# Patient Record
Sex: FEMALE | Race: WHITE
[De-identification: names, ages, dates, MRNs, and addresses within clinical notes are randomized per-mention and may not be internally consistent; named-entity substitution may affect disease eponyms.]

---

## 2017-05-14 ENCOUNTER — HOSPITAL ENCOUNTER (EMERGENCY)
Dept: HOSPITAL 54 - ER | Age: 72
Discharge: HOME | End: 2017-05-14
Payer: MEDICARE

## 2017-05-14 VITALS — WEIGHT: 124 LBS | BODY MASS INDEX: 21.17 KG/M2 | HEIGHT: 64 IN

## 2017-05-14 VITALS — DIASTOLIC BLOOD PRESSURE: 84 MMHG | SYSTOLIC BLOOD PRESSURE: 206 MMHG

## 2017-05-14 DIAGNOSIS — I10: Primary | ICD-10-CM

## 2017-05-14 PROCEDURE — Z7502: HCPCS

## 2017-05-14 PROCEDURE — Z7610: HCPCS

## 2017-05-14 PROCEDURE — A4606 OXYGEN PROBE USED W OXIMETER: HCPCS

## 2017-05-14 NOTE — NUR
PT BIB FAMILY PT [facility] TO ER BED 1 C/OHIGH BLOOD PRESSURE, TAKES: CRESTOR 
5MG, BYSTOLIC 5MG, BENIKAR 40MG. PT AOX4 RR EVEN AND UNLABORED. NO SOB NOTED. 
NAD NOTED. NO NVD AT THIS TIME. PT NOT DIAPHORETIC. PT GOWNED AND PLACED ON 
MONTIOR. DR. TIM AT BEDSIDE FOR EVAL.

## 2021-03-12 ENCOUNTER — HOSPITAL ENCOUNTER (EMERGENCY)
Dept: HOSPITAL 54 - ER | Age: 76
Discharge: HOME | End: 2021-03-12
Payer: MEDICARE

## 2021-03-12 VITALS — SYSTOLIC BLOOD PRESSURE: 171 MMHG | DIASTOLIC BLOOD PRESSURE: 72 MMHG

## 2021-03-12 VITALS — BODY MASS INDEX: 23.19 KG/M2 | HEIGHT: 62 IN | WEIGHT: 126 LBS

## 2021-03-12 DIAGNOSIS — M25.462: Primary | ICD-10-CM

## 2021-03-12 DIAGNOSIS — I10: ICD-10-CM

## 2021-03-12 PROCEDURE — 99283 EMERGENCY DEPT VISIT LOW MDM: CPT

## 2021-03-12 PROCEDURE — 89051 BODY FLUID CELL COUNT: CPT

## 2021-03-12 PROCEDURE — 87070 CULTURE OTHR SPECIMN AEROBIC: CPT

## 2021-03-12 PROCEDURE — 36415 COLL VENOUS BLD VENIPUNCTURE: CPT

## 2021-03-12 PROCEDURE — A6407 PACKING STRIPS, NON-IMPREG: HCPCS

## 2021-03-12 NOTE — NUR
The patient is resting in bed. Denies pain at this time. In room air and denies 
SOB. Respiration regular and unlabored. Will continue to monitor.

## 2021-03-12 NOTE — NUR
The patient is in ER for c/o left knee pain started 3days ago worse 
now/swollen. Patient denies any trauma or injury. Denies SOB. Will continue to 
monitor.

## 2021-08-10 ENCOUNTER — HOSPITAL ENCOUNTER (INPATIENT)
Dept: HOSPITAL 54 - ER | Age: 76
LOS: 3 days | Discharge: NURSING, REHAB OR LONG-TERM CARE | DRG: 871 | End: 2021-08-13
Attending: NURSE PRACTITIONER | Admitting: NURSE PRACTITIONER
Payer: MEDICARE

## 2021-08-10 VITALS — DIASTOLIC BLOOD PRESSURE: 77 MMHG | SYSTOLIC BLOOD PRESSURE: 155 MMHG

## 2021-08-10 VITALS — WEIGHT: 123 LBS | BODY MASS INDEX: 23.22 KG/M2 | HEIGHT: 61 IN

## 2021-08-10 DIAGNOSIS — E78.5: ICD-10-CM

## 2021-08-10 DIAGNOSIS — I70.0: ICD-10-CM

## 2021-08-10 DIAGNOSIS — U07.1: ICD-10-CM

## 2021-08-10 DIAGNOSIS — R09.02: ICD-10-CM

## 2021-08-10 DIAGNOSIS — Z79.82: ICD-10-CM

## 2021-08-10 DIAGNOSIS — I10: ICD-10-CM

## 2021-08-10 DIAGNOSIS — J06.9: ICD-10-CM

## 2021-08-10 DIAGNOSIS — E87.1: ICD-10-CM

## 2021-08-10 DIAGNOSIS — A41.89: Primary | ICD-10-CM

## 2021-08-10 DIAGNOSIS — Z79.899: ICD-10-CM

## 2021-08-10 DIAGNOSIS — N17.0: ICD-10-CM

## 2021-08-10 DIAGNOSIS — E86.0: ICD-10-CM

## 2021-08-10 DIAGNOSIS — I95.9: ICD-10-CM

## 2021-08-10 DIAGNOSIS — E78.00: ICD-10-CM

## 2021-08-10 LAB
ALBUMIN SERPL BCP-MCNC: 3.4 G/DL (ref 3.4–5)
ALP SERPL-CCNC: 38 U/L (ref 46–116)
ALT SERPL W P-5'-P-CCNC: 17 U/L (ref 12–78)
AST SERPL W P-5'-P-CCNC: 39 U/L (ref 15–37)
BASOPHILS # BLD AUTO: 0 K/UL (ref 0–0.2)
BASOPHILS NFR BLD AUTO: 0.8 % (ref 0–2)
BILIRUB DIRECT SERPL-MCNC: 0.1 MG/DL (ref 0–0.2)
BILIRUB SERPL-MCNC: 0.3 MG/DL (ref 0.2–1)
BILIRUB UR QL STRIP: NEGATIVE
BUN SERPL-MCNC: 24 MG/DL (ref 7–18)
CALCIUM SERPL-MCNC: 8.8 MG/DL (ref 8.5–10.1)
CHLORIDE SERPL-SCNC: 99 MMOL/L (ref 98–107)
CO2 SERPL-SCNC: 23 MMOL/L (ref 21–32)
COLOR UR: YELLOW
CREAT SERPL-MCNC: 1.5 MG/DL (ref 0.6–1.3)
EOSINOPHIL NFR BLD AUTO: 0 % (ref 0–6)
GLUCOSE SERPL-MCNC: 137 MG/DL (ref 74–106)
GLUCOSE UR STRIP-MCNC: NEGATIVE MG/DL
HCT VFR BLD AUTO: 39 % (ref 33–45)
HGB BLD-MCNC: 13.1 G/DL (ref 11.5–14.8)
LEUKOCYTE ESTERASE UR QL STRIP: NEGATIVE
LYMPHOCYTES NFR BLD AUTO: 1 K/UL (ref 0.8–4.8)
LYMPHOCYTES NFR BLD AUTO: 18.1 % (ref 20–44)
MCHC RBC AUTO-ENTMCNC: 34 G/DL (ref 31–36)
MCV RBC AUTO: 91 FL (ref 82–100)
MONOCYTES NFR BLD AUTO: 0.7 K/UL (ref 0.1–1.3)
MONOCYTES NFR BLD AUTO: 12 % (ref 2–12)
NEUTROPHILS # BLD AUTO: 3.8 K/UL (ref 1.8–8.9)
NEUTROPHILS NFR BLD AUTO: 69.1 % (ref 43–81)
NITRITE UR QL STRIP: NEGATIVE
PH UR STRIP: 6 [PH] (ref 5–8)
PLATELET # BLD AUTO: 144 K/UL (ref 150–450)
POTASSIUM SERPL-SCNC: 4.6 MMOL/L (ref 3.5–5.1)
PROT SERPL-MCNC: 7 G/DL (ref 6.4–8.2)
PROT UR QL STRIP: 30 MG/DL
RBC # BLD AUTO: 4.29 MIL/UL (ref 4–5.2)
RBC #/AREA URNS HPF: (no result) /HPF (ref 0–2)
SODIUM SERPL-SCNC: 134 MMOL/L (ref 136–145)
UROBILINOGEN UR STRIP-MCNC: 0.2 EU/DL
WBC #/AREA URNS HPF: (no result) /HPF (ref 0–3)
WBC NRBC COR # BLD AUTO: 5.6 K/UL (ref 4.3–11)

## 2021-08-10 PROCEDURE — G0378 HOSPITAL OBSERVATION PER HR: HCPCS

## 2021-08-10 PROCEDURE — C9803 HOPD COVID-19 SPEC COLLECT: HCPCS

## 2021-08-10 RX ADMIN — SODIUM CHLORIDE PRN MLS/HR: 9 INJECTION, SOLUTION INTRAVENOUS at 20:38

## 2021-08-10 RX ADMIN — ACETAMINOPHEN PRN MG: 325 TABLET ORAL at 21:07

## 2021-08-10 RX ADMIN — ATORVASTATIN CALCIUM SCH MG: 10 TABLET, FILM COATED ORAL at 21:07

## 2021-08-10 RX ADMIN — HEPARIN SODIUM SCH UNITS: 5000 INJECTION INTRAVENOUS; SUBCUTANEOUS at 21:08

## 2021-08-10 RX ADMIN — ATORVASTATIN CALCIUM SCH MG: 10 TABLET, FILM COATED ORAL at 21:24

## 2021-08-10 NOTE — NUR
RN NOTE 



PATIENT REFUSED TO TAKE LIPITOR , STATES SHE ALREADY HAS TAKEN HER CHOLESTEROL MEDICATION 
FOR TODAY. HNO MEDICATIONS AT BEDSIDE AT THIS TIME.

## 2021-08-10 NOTE — NUR
RN ADMISSION NOTE 



RECEIVED PATIENT VIA GURNEY. A/OX3. LANGUAGE BARRIER. ABLE TO MAKE BASIC NEEDS KNOWN. 
TOLERATING ROOM AIR. RESPIRATIONS ARE EVEN AND UNLABORED. NO C/O PAIN AT THIS TIME. IN NO 
APPARENT DISTRESS. IV ACCESS IN RIGHT HAND PATENT AND SALINE LOCKED. WILL BEGIN IVF. 
OBTAINED PATIENTS INFORMATION FROM DAUGHTER RADHA [phone]. CNA OBTAINED VITAL SIGNS 
AND COMPLETED BELONGING LIST. INITIAL PHYSICAL ASSESSMENT COMPETED AT THIS TIME. SKIN 
ASSESSMENT COMPLETE, SKIN INTACT. BED IS LOW AND LOCKED, HOB ELEVTED IN SEMI FOWLERS, SIDE 
RAILS UP X2, CALL LIGHT WITHIN REACH. EXPLAINED USE. WILL CONTINUE TO MONITOR THROUGHOUT 
SHIFT. 

-------------------------------------------------------------------------------

Addendum: 08/10/21 at 9012 by REGINO OZUNA RN

-------------------------------------------------------------------------------

TELE MONITOR READS SINUS RHYTHM WITH PAC'S

## 2021-08-10 NOTE — NUR
TO ER BED 1, C/O DIARRHEA WEAKNESS X 3 DAYS S/P TAKING AMOXICILLIN X 3 DAYS  
FOR COUGH, SALINE LOCK ESTABLISHED, BLOOD DRAWN AND SENT TO LAB

## 2021-08-11 VITALS — DIASTOLIC BLOOD PRESSURE: 63 MMHG | SYSTOLIC BLOOD PRESSURE: 179 MMHG

## 2021-08-11 VITALS — DIASTOLIC BLOOD PRESSURE: 52 MMHG | SYSTOLIC BLOOD PRESSURE: 104 MMHG

## 2021-08-11 VITALS — DIASTOLIC BLOOD PRESSURE: 59 MMHG | SYSTOLIC BLOOD PRESSURE: 122 MMHG

## 2021-08-11 LAB
BASOPHILS # BLD AUTO: 0 K/UL (ref 0–0.2)
BASOPHILS NFR BLD AUTO: 0.5 % (ref 0–2)
BUN SERPL-MCNC: 19 MG/DL (ref 7–18)
CALCIUM SERPL-MCNC: 7.6 MG/DL (ref 8.5–10.1)
CHLORIDE SERPL-SCNC: 105 MMOL/L (ref 98–107)
CHOLEST SERPL-MCNC: 63 MG/DL (ref ?–200)
CO2 SERPL-SCNC: 24 MMOL/L (ref 21–32)
CREAT SERPL-MCNC: 1.1 MG/DL (ref 0.6–1.3)
CRP SERPL-MCNC: 6.7 MG/DL (ref 0–0.9)
EOSINOPHIL NFR BLD AUTO: 0.1 % (ref 0–6)
FERRITIN SERPL-MCNC: 1155 NG/ML (ref 8–388)
GLUCOSE SERPL-MCNC: 92 MG/DL (ref 74–106)
HCT VFR BLD AUTO: 36 % (ref 33–45)
HDLC SERPL-MCNC: 29 MG/DL (ref 40–60)
HGB BLD-MCNC: 12.1 G/DL (ref 11.5–14.8)
LDLC SERPL DIRECT ASSAY-MCNC: 24 MG/DL (ref 0–99)
LYMPHOCYTES NFR BLD AUTO: 1.5 K/UL (ref 0.8–4.8)
LYMPHOCYTES NFR BLD AUTO: 31 % (ref 20–44)
MAGNESIUM SERPL-MCNC: 1.9 MG/DL (ref 1.8–2.4)
MCHC RBC AUTO-ENTMCNC: 34 G/DL (ref 31–36)
MCV RBC AUTO: 91 FL (ref 82–100)
MONOCYTES NFR BLD AUTO: 0.4 K/UL (ref 0.1–1.3)
MONOCYTES NFR BLD AUTO: 7.9 % (ref 2–12)
NEUTROPHILS # BLD AUTO: 3 K/UL (ref 1.8–8.9)
NEUTROPHILS NFR BLD AUTO: 60.5 % (ref 43–81)
PHOSPHATE SERPL-MCNC: 3.6 MG/DL (ref 2.5–4.9)
PLATELET # BLD AUTO: 143 K/UL (ref 150–450)
POTASSIUM SERPL-SCNC: 4 MMOL/L (ref 3.5–5.1)
RBC # BLD AUTO: 3.94 MIL/UL (ref 4–5.2)
SODIUM SERPL-SCNC: 139 MMOL/L (ref 136–145)
TRIGL SERPL-MCNC: 75 MG/DL (ref 30–150)
WBC NRBC COR # BLD AUTO: 5 K/UL (ref 4.3–11)

## 2021-08-11 RX ADMIN — ATORVASTATIN CALCIUM SCH MG: 10 TABLET, FILM COATED ORAL at 21:29

## 2021-08-11 RX ADMIN — DEXTROSE MONOHYDRATE SCH MLS/HR: 50 INJECTION, SOLUTION INTRAVENOUS at 14:48

## 2021-08-11 RX ADMIN — PANTOPRAZOLE SODIUM SCH MG: 40 TABLET, DELAYED RELEASE ORAL at 07:52

## 2021-08-11 RX ADMIN — METOPROLOL TARTRATE SCH MG: 25 TABLET, FILM COATED ORAL at 21:30

## 2021-08-11 RX ADMIN — HEPARIN SODIUM SCH UNITS: 5000 INJECTION INTRAVENOUS; SUBCUTANEOUS at 21:32

## 2021-08-11 RX ADMIN — ASPIRIN 81 MG SCH MG: 81 TABLET ORAL at 09:56

## 2021-08-11 RX ADMIN — SODIUM CHLORIDE PRN MLS/HR: 9 INJECTION, SOLUTION INTRAVENOUS at 21:30

## 2021-08-11 RX ADMIN — HEPARIN SODIUM SCH UNITS: 5000 INJECTION INTRAVENOUS; SUBCUTANEOUS at 08:14

## 2021-08-11 RX ADMIN — ACETAMINOPHEN PRN MG: 325 TABLET ORAL at 05:09

## 2021-08-11 NOTE — NUR
RN NOTE



PATIENT ALERT AND ORIENTED X3. ON O2 2L VIA NASAL CANNULA. RESPIRATIONS EVEN AND UNLABORED. 
DENIES ANY PAIN OR DISCOMFORT. TELE MONITOR ON, SR 70'S. IV ACCESS ON RIGHT HAND # 20 WITH 
NS @ 75ML/HR. BED LOCKED AND IN LOWEST POSITION. CALL LIGHT WITHIN REACH. ALL NEEDS 
ANTICIPATED.

## 2021-08-11 NOTE — NUR
RN NOTE 



COVID ISOLATION. RESTING IN BED. A/OX3. ON 2L NASAL CANNULA. NO C/O PAIN AT THIS TIME. 
TYLENOL GIVEN FOR SLIGHT TEMP 100.8 .NO DISTRESS. IV IN RIGHT HAND . BED IS LOW AND LOCKED, 
HOB ELEVATED IN SEMI FOWLERS, SIDE RAILS UP X2, CALL LIGHT WITHIN REACH. WILL ENDORSE TO 
ONCOMING SHIFT.

## 2021-08-11 NOTE — NUR
RN NOTES

PATIENT IS A&O X3, ON COVID ISOLATION AT THIS TIME , RESTING IN BED WITH ON AND OF SLEEP , 
ON 2L NASAL CANNULA COMPLIANT WITH CARE, NO C/O PAIN DURING AM CARE, NO SOB  AND NO DISTRESS 
NOTED,  IV INTACT ON RIGHT SIDE,  BED IS LOW AND LOCKED, HOB ELEVATED IN SEMI FOWLERS, SIDE 
RAILS UP X2, CALL LIGHT WITHIN REACH, DAUGHTER CALLS OFTEN -ABLE TO EXPLAIN TO HER ABOUT 
MOTHERS SITUATION -EXPRESSED UNDERSTANDING, CALL LIGHT IN PT REACH, SAFETY MEASURES IN 
PLACE, GIVEN ALL MD ORDERS NO ASPIRATION ON WHOLE PO PILLS, WILL CONTINUE TO MONITOR.

## 2021-08-11 NOTE — NUR
PATIENT NOTED WITH BLOOD PRESSURE 179/63, HR 78. INFORMED DR. GARCIA WITH NEW ORDERS NOTED AND 
CARRIED OUT.

## 2021-08-12 VITALS — DIASTOLIC BLOOD PRESSURE: 55 MMHG | SYSTOLIC BLOOD PRESSURE: 133 MMHG

## 2021-08-12 VITALS — DIASTOLIC BLOOD PRESSURE: 58 MMHG | SYSTOLIC BLOOD PRESSURE: 137 MMHG

## 2021-08-12 VITALS — DIASTOLIC BLOOD PRESSURE: 82 MMHG | SYSTOLIC BLOOD PRESSURE: 144 MMHG

## 2021-08-12 VITALS — SYSTOLIC BLOOD PRESSURE: 118 MMHG | DIASTOLIC BLOOD PRESSURE: 51 MMHG

## 2021-08-12 VITALS — SYSTOLIC BLOOD PRESSURE: 130 MMHG | DIASTOLIC BLOOD PRESSURE: 60 MMHG

## 2021-08-12 VITALS — DIASTOLIC BLOOD PRESSURE: 72 MMHG | SYSTOLIC BLOOD PRESSURE: 156 MMHG

## 2021-08-12 LAB
BASE EXCESS BLDA CALC-SCNC: -0.9 MMOL/L
BASOPHILS # BLD AUTO: 0 K/UL (ref 0–0.2)
BASOPHILS NFR BLD AUTO: 0.3 % (ref 0–2)
BUN SERPL-MCNC: 15 MG/DL (ref 7–18)
CALCIUM SERPL-MCNC: 7.4 MG/DL (ref 8.5–10.1)
CHLORIDE SERPL-SCNC: 104 MMOL/L (ref 98–107)
CO2 SERPL-SCNC: 21 MMOL/L (ref 21–32)
CREAT SERPL-MCNC: 1.1 MG/DL (ref 0.6–1.3)
DO-HGB MFR BLDA: 70.8 MMHG
EOSINOPHIL NFR BLD AUTO: 0 % (ref 0–6)
GLUCOSE SERPL-MCNC: 82 MG/DL (ref 74–106)
HCT VFR BLD AUTO: 36 % (ref 33–45)
HGB BLD-MCNC: 12.2 G/DL (ref 11.5–14.8)
INHALED O2 CONCENTRATION: 24 %
LYMPHOCYTES NFR BLD AUTO: 1.5 K/UL (ref 0.8–4.8)
LYMPHOCYTES NFR BLD AUTO: 24.5 % (ref 20–44)
MAGNESIUM SERPL-MCNC: 1.9 MG/DL (ref 1.8–2.4)
MCHC RBC AUTO-ENTMCNC: 34 G/DL (ref 31–36)
MCV RBC AUTO: 90 FL (ref 82–100)
MONOCYTES NFR BLD AUTO: 0.6 K/UL (ref 0.1–1.3)
MONOCYTES NFR BLD AUTO: 9.6 % (ref 2–12)
NEUTROPHILS # BLD AUTO: 3.9 K/UL (ref 1.8–8.9)
NEUTROPHILS NFR BLD AUTO: 65.6 % (ref 43–81)
PCO2 TEMP ADJ BLDA: 29.6 MMHG (ref 35–45)
PH TEMP ADJ BLDA: 7.48 [PH] (ref 7.35–7.45)
PHOSPHATE SERPL-MCNC: 2.9 MG/DL (ref 2.5–4.9)
PLATELET # BLD AUTO: 165 K/UL (ref 150–450)
PO2 TEMP ADJ BLDA: 65.1 MMHG (ref 75–100)
POTASSIUM SERPL-SCNC: 3.8 MMOL/L (ref 3.5–5.1)
RBC # BLD AUTO: 3.96 MIL/UL (ref 4–5.2)
SAO2 % BLDA: 94.2 % (ref 92–98.5)
SODIUM SERPL-SCNC: 138 MMOL/L (ref 136–145)
VENTILATION MODE VENT: (no result)
WBC NRBC COR # BLD AUTO: 6 K/UL (ref 4.3–11)

## 2021-08-12 RX ADMIN — PANTOPRAZOLE SODIUM SCH MG: 40 TABLET, DELAYED RELEASE ORAL at 08:23

## 2021-08-12 RX ADMIN — ATORVASTATIN CALCIUM SCH MG: 10 TABLET, FILM COATED ORAL at 21:18

## 2021-08-12 RX ADMIN — ASPIRIN 81 MG SCH MG: 81 TABLET ORAL at 08:26

## 2021-08-12 RX ADMIN — ENOXAPARIN SODIUM SCH MG: 40 INJECTION SUBCUTANEOUS at 12:00

## 2021-08-12 RX ADMIN — DEXAMETHASONE SCH MG: 4 TABLET ORAL at 10:13

## 2021-08-12 RX ADMIN — METOPROLOL TARTRATE SCH MG: 25 TABLET, FILM COATED ORAL at 21:18

## 2021-08-12 RX ADMIN — SODIUM CHLORIDE PRN MLS/HR: 9 INJECTION, SOLUTION INTRAVENOUS at 22:46

## 2021-08-12 RX ADMIN — HEPARIN SODIUM SCH UNITS: 5000 INJECTION INTRAVENOUS; SUBCUTANEOUS at 08:27

## 2021-08-12 RX ADMIN — METOPROLOL TARTRATE SCH MG: 25 TABLET, FILM COATED ORAL at 08:26

## 2021-08-12 RX ADMIN — DEXTROSE MONOHYDRATE SCH MLS/HR: 50 INJECTION, SOLUTION INTRAVENOUS at 14:12

## 2021-08-12 NOTE — NUR
RN NOTE



PATIENT RESTING IN BED COMFORTABLY. ALERT AND ORIENTED X3. NO SIGNS OF RESPIRATORY DISTRESS. 
ALL NEEDS ATTENDED PROMPTLY. BED LOCKED AND IN LOWEST POSITION. CALL LIGHT WITHIN REACH. 
WILL ENDORSE TO AM SHIFT.

## 2021-08-12 NOTE — NUR
RN OPENING NOTE



RECEIVED PATIENT IN BED, ASLEEP,  EASILY AWAKEN BY VERBAL AND TACTILE STIMULI. ON O2 2L VIA 
NASAL CANNULA AT 2LPM, RESPIRATIONS EVEN AND UNLABORED. DENIES ANY PAIN OR DISCOMFORT. TELE 
MONITOR ON, SR 70'S. IV ACCESS ON RIGHT HAND # 20 WITH NS @ 75ML/HR. SAFETY PRECAUTIONS IN 
PLACE: BED LOCKED AND IN LOWEST POSITION. CALL LIGHT WITHIN REACH. SIDE RAILS UP X 2. WILL 
MONITOR PATIENT ACCORDINGLY.

## 2021-08-12 NOTE — NUR
TELE RN CLOSING NOTES



PATIENT IN BED, AWAKE, A&O X 4. ON O2 2L VIA NASAL CANNULA AT 2LPM, RESPIRATIONS EVEN AND 
UNLABORED. DENIES ANY PAIN OR DISCOMFORT. TELE MONITOR ON, SR 60'S. IV ACCESS ON RIGHT HAND 
# 20 WITH NS @ 75ML/HR. SAFETY PRECAUTIONS IN PLACE: BED LOCKED AND IN LOWEST POSITION. CALL 
LIGHT WITHIN REACH. SIDE RAILS UP X 2. ALL NEEDS ATTENDED, WILL ENDORSE TO ONCOMING SHIFT 
FOR VALARIE.

## 2021-08-13 VITALS — SYSTOLIC BLOOD PRESSURE: 153 MMHG | DIASTOLIC BLOOD PRESSURE: 65 MMHG

## 2021-08-13 VITALS — DIASTOLIC BLOOD PRESSURE: 80 MMHG | SYSTOLIC BLOOD PRESSURE: 131 MMHG

## 2021-08-13 VITALS — SYSTOLIC BLOOD PRESSURE: 160 MMHG | DIASTOLIC BLOOD PRESSURE: 66 MMHG

## 2021-08-13 VITALS — DIASTOLIC BLOOD PRESSURE: 71 MMHG | SYSTOLIC BLOOD PRESSURE: 155 MMHG

## 2021-08-13 VITALS — SYSTOLIC BLOOD PRESSURE: 155 MMHG | DIASTOLIC BLOOD PRESSURE: 70 MMHG

## 2021-08-13 LAB
BASOPHILS # BLD AUTO: 0 K/UL (ref 0–0.2)
BASOPHILS NFR BLD AUTO: 0.3 % (ref 0–2)
BUN SERPL-MCNC: 17 MG/DL (ref 7–18)
CALCIUM SERPL-MCNC: 7.5 MG/DL (ref 8.5–10.1)
CHLORIDE SERPL-SCNC: 105 MMOL/L (ref 98–107)
CO2 SERPL-SCNC: 23 MMOL/L (ref 21–32)
CREAT SERPL-MCNC: 0.8 MG/DL (ref 0.6–1.3)
EOSINOPHIL NFR BLD AUTO: 0 % (ref 0–6)
GLUCOSE SERPL-MCNC: 152 MG/DL (ref 74–106)
HCT VFR BLD AUTO: 37 % (ref 33–45)
HGB BLD-MCNC: 12.7 G/DL (ref 11.5–14.8)
LYMPHOCYTES NFR BLD AUTO: 1.2 K/UL (ref 0.8–4.8)
LYMPHOCYTES NFR BLD AUTO: 14 % (ref 20–44)
MAGNESIUM SERPL-MCNC: 2.1 MG/DL (ref 1.8–2.4)
MCHC RBC AUTO-ENTMCNC: 34 G/DL (ref 31–36)
MCV RBC AUTO: 90 FL (ref 82–100)
MONOCYTES NFR BLD AUTO: 0.6 K/UL (ref 0.1–1.3)
MONOCYTES NFR BLD AUTO: 7 % (ref 2–12)
NEUTROPHILS # BLD AUTO: 6.8 K/UL (ref 1.8–8.9)
NEUTROPHILS NFR BLD AUTO: 78.7 % (ref 43–81)
PHOSPHATE SERPL-MCNC: 2.2 MG/DL (ref 2.5–4.9)
PLATELET # BLD AUTO: 184 K/UL (ref 150–450)
POTASSIUM SERPL-SCNC: 3.7 MMOL/L (ref 3.5–5.1)
RBC # BLD AUTO: 4.17 MIL/UL (ref 4–5.2)
SODIUM SERPL-SCNC: 138 MMOL/L (ref 136–145)
WBC NRBC COR # BLD AUTO: 8.7 K/UL (ref 4.3–11)

## 2021-08-13 RX ADMIN — PANTOPRAZOLE SODIUM SCH MG: 40 TABLET, DELAYED RELEASE ORAL at 08:04

## 2021-08-13 RX ADMIN — ENOXAPARIN SODIUM SCH MG: 40 INJECTION SUBCUTANEOUS at 11:03

## 2021-08-13 RX ADMIN — DEXAMETHASONE SCH MG: 4 TABLET ORAL at 08:04

## 2021-08-13 RX ADMIN — DEXTROSE MONOHYDRATE SCH MLS/HR: 50 INJECTION, SOLUTION INTRAVENOUS at 14:23

## 2021-08-13 RX ADMIN — METOPROLOL TARTRATE SCH MG: 25 TABLET, FILM COATED ORAL at 08:04

## 2021-08-13 RX ADMIN — ASPIRIN 81 MG SCH MG: 81 TABLET ORAL at 08:03

## 2021-08-13 NOTE — NUR
RN NOTES



SPOKE W/ PATIENT AND PATIENT'S DTR ABOUT POSSIBLE DISCHARGE TODAY TO HOME. PER PATIENT AND 
DTR, PATIENT STILL FEELS WEAK AND NO ONE CAN TAKE CARE OF HER AT HOME. INFORMED  
SHERYL ABOUT PATIENT'S CASE AND WILL CHECK INSURANCE FOR PLACEMENT.

## 2021-08-13 NOTE — NUR
RN NOTES



SPOKE W/ PATIENT'S DTR RADHA ([phone]) AND INFORMED ABOUT PATIENT'S CURRENT 
CONDITION/PROGRESS AND PLAN OF CARE.

## 2021-08-13 NOTE — NUR
RN NOTES



PATIENT REPORT AND DISCHARGE INSTRUCTIONS GIVEN TO TIMOTHY GUIDO SUPERVISOR AT [facility].

## 2021-08-13 NOTE — NUR
TELE RN NOTES



PATIENT IN BED RESTING, AWAKE AND VERBALLY RESPONSIVE, A&O X 4. Nicaraguan-SPEAKING, UNDERSTANDS 
LITTLE ENGLISH. ON O2 AT 2L VIA NASAL CANNULA, RESPIRATIONS EVEN AND UNLABORED. ON TELE 
MONITORING, READING OF SR W/ HEART RATE IN THE 60'S. IV ACCESS ON RIGHT HAND # 20 INTACT AND 
PATENT, IVF OF NS @ 75ML/HR, INFUSING WELL. SAFETY PRECAUTIONS IN PLACE: BED LOCKED AND IN 
LOWEST POSITION. CALL LIGHT WITHIN REACH. SIDE RAILS UP X 2. WILL CONTINUE TO MONITOR.

## 2021-08-13 NOTE — NUR
RN notes



Alert and oriented, able to communicate needs verbally in Wallisian language but understand a 
little English. Ambulatory with assist. On 2 l/m O2 via nasal cannula, tolerating well. No 
complaint of pain or discomfort. No significant change of condition. Vital signs within 
normal limits. Kept clean and dry. Will endorse to next shift for continuity of care.

## 2021-08-13 NOTE — NUR
RN NOTES



SPOKE JOSELITO JOHN, , AND WAS INFORMED THAT PATIENT IS ACCEPTED FOR PLACEMENT AT 
[facility]; CM TO INFORM DTR.

## 2021-08-15 ENCOUNTER — HOSPITAL ENCOUNTER (INPATIENT)
Dept: HOSPITAL 54 - ER | Age: 76
LOS: 7 days | Discharge: HOME HEALTH | DRG: 177 | End: 2021-08-22
Attending: NURSE PRACTITIONER
Payer: MEDICARE

## 2021-08-15 VITALS — HEIGHT: 64 IN | BODY MASS INDEX: 20.04 KG/M2 | WEIGHT: 117.38 LBS

## 2021-08-15 VITALS — SYSTOLIC BLOOD PRESSURE: 165 MMHG | DIASTOLIC BLOOD PRESSURE: 73 MMHG

## 2021-08-15 DIAGNOSIS — T38.0X5A: ICD-10-CM

## 2021-08-15 DIAGNOSIS — Z79.82: ICD-10-CM

## 2021-08-15 DIAGNOSIS — I11.0: ICD-10-CM

## 2021-08-15 DIAGNOSIS — J12.82: ICD-10-CM
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
## 2021-08-15 DIAGNOSIS — J90: ICD-10-CM

## 2021-08-15 DIAGNOSIS — J96.01: ICD-10-CM

## 2021-08-15 DIAGNOSIS — N39.0: ICD-10-CM

## 2021-08-15 DIAGNOSIS — E78.5: ICD-10-CM

## 2021-08-15 DIAGNOSIS — D72.829: ICD-10-CM

## 2021-08-15 DIAGNOSIS — D68.59: ICD-10-CM

## 2021-08-15 DIAGNOSIS — I50.9: ICD-10-CM

## 2021-08-15 DIAGNOSIS — J98.11: ICD-10-CM

## 2021-08-15 DIAGNOSIS — R73.9: ICD-10-CM

## 2021-08-15 DIAGNOSIS — Z79.899: ICD-10-CM

## 2021-08-15 DIAGNOSIS — U07.1: Primary | ICD-10-CM

## 2021-08-15 DIAGNOSIS — J12.89: ICD-10-CM

## 2021-08-15 DIAGNOSIS — I70.0: ICD-10-CM

## 2021-08-15 DIAGNOSIS — E44.0: ICD-10-CM

## 2021-08-15 DIAGNOSIS — Z74.09: ICD-10-CM

## 2021-08-15 LAB
ALBUMIN SERPL BCP-MCNC: 2.6 G/DL (ref 3.4–5)
ALP SERPL-CCNC: 53 U/L (ref 46–116)
ALT SERPL W P-5'-P-CCNC: 32 U/L (ref 12–78)
AST SERPL W P-5'-P-CCNC: 60 U/L (ref 15–37)
BASE EXCESS BLDA CALC-SCNC: 0.3 MMOL/L
BASOPHILS # BLD AUTO: 0 K/UL (ref 0–0.2)
BASOPHILS NFR BLD AUTO: 0.1 % (ref 0–2)
BILIRUB DIRECT SERPL-MCNC: 0.1 MG/DL (ref 0–0.2)
BILIRUB SERPL-MCNC: 0.3 MG/DL (ref 0.2–1)
BILIRUB UR QL STRIP: NEGATIVE
BUN SERPL-MCNC: 25 MG/DL (ref 7–18)
CALCIUM SERPL-MCNC: 8.2 MG/DL (ref 8.5–10.1)
CHLORIDE SERPL-SCNC: 104 MMOL/L (ref 98–107)
CK SERPL-CCNC: 73 U/L (ref 26–192)
CO2 SERPL-SCNC: 26 MMOL/L (ref 21–32)
COLOR UR: YELLOW
CREAT SERPL-MCNC: 1 MG/DL (ref 0.6–1.3)
CRP SERPL-MCNC: 8.2 MG/DL (ref 0–0.9)
D DIMER PPP FEU-MCNC: 3.47 MG/L(FEU (ref 0.17–0.5)
DEPRECATED SQUAMOUS URNS QL MICRO: (no result) /HPF
DO-HGB MFR BLDA: 180.2 MMHG
EOSINOPHIL NFR BLD AUTO: 0 % (ref 0–6)
FERRITIN SERPL-MCNC: 3187 NG/ML (ref 8–388)
GLUCOSE SERPL-MCNC: 207 MG/DL (ref 74–106)
GLUCOSE UR STRIP-MCNC: NEGATIVE MG/DL
HCT VFR BLD AUTO: 37 % (ref 33–45)
HGB BLD-MCNC: 12.4 G/DL (ref 11.5–14.8)
INHALED O2 CONCENTRATION: 40 %
LEUKOCYTE ESTERASE UR QL STRIP: NEGATIVE
LYMPHOCYTES NFR BLD AUTO: 0.7 K/UL (ref 0.8–4.8)
LYMPHOCYTES NFR BLD AUTO: 6.5 % (ref 20–44)
MCHC RBC AUTO-ENTMCNC: 34 G/DL (ref 31–36)
MCV RBC AUTO: 90 FL (ref 82–100)
MONOCYTES NFR BLD AUTO: 0.9 K/UL (ref 0.1–1.3)
MONOCYTES NFR BLD AUTO: 7.9 % (ref 2–12)
NEUTROPHILS # BLD AUTO: 9.4 K/UL (ref 1.8–8.9)
NEUTROPHILS NFR BLD AUTO: 85.5 % (ref 43–81)
NITRITE UR QL STRIP: NEGATIVE
PCO2 TEMP ADJ BLDA: 32.2 MMHG (ref 35–45)
PH TEMP ADJ BLDA: 7.47 [PH] (ref 7.35–7.45)
PH UR STRIP: 6 [PH] (ref 5–8)
PLATELET # BLD AUTO: 287 K/UL (ref 150–450)
PO2 TEMP ADJ BLDA: 68 MMHG (ref 75–100)
POTASSIUM SERPL-SCNC: 4.1 MMOL/L (ref 3.5–5.1)
PROT SERPL-MCNC: 6.3 G/DL (ref 6.4–8.2)
PROT UR QL STRIP: 30 MG/DL
RBC # BLD AUTO: 4.06 MIL/UL (ref 4–5.2)
RBC #/AREA URNS HPF: (no result) /HPF (ref 0–2)
SAO2 % BLDA: 93.8 % (ref 92–98.5)
SODIUM SERPL-SCNC: 139 MMOL/L (ref 136–145)
UROBILINOGEN UR STRIP-MCNC: 1 EU/DL
VENTILATION MODE VENT: (no result)
WBC #/AREA URNS HPF: (no result) /HPF
WBC #/AREA URNS HPF: (no result) /HPF (ref 0–3)
WBC NRBC COR # BLD AUTO: 10.9 K/UL (ref 4.3–11)

## 2021-08-15 PROCEDURE — G0378 HOSPITAL OBSERVATION PER HR: HCPCS

## 2021-08-15 PROCEDURE — A4216 STERILE WATER/SALINE, 10 ML: HCPCS

## 2021-08-15 RX ADMIN — ENOXAPARIN SODIUM SCH MG: 40 INJECTION SUBCUTANEOUS at 23:00

## 2021-08-15 NOTE — NUR
PATIENT ANNELIESE BENITES FROM [facility] WITH C/O LOW O2 SAT. DIAGNOSED W/ COVID 19 6 
DAYS AGO. PATIENT IS A/O, ANSWERING STAFF. CONNECTED TO OXYGEN. PATIENT 
CONNECTED TO CARDIAC MONITOR AND POX.

## 2021-08-15 NOTE — NUR
RN ADMISSION NOTE 



RECEIVED PATIENT VIA RDORINA. A/OX3. Liberian SPEAKING, ABLE TO MAKE BASIC NEEDS KNOWN. ON 
OXYGEN 5L/MIN. RESPIRATIONS ARE EVEN AND UNLABORED. NO S/S SOB AT THIS TIME. PATIENT HAD 
NONPRODUCTIVE COUGH. NO C/O PAIN AT THIS TIME. IN NO APPARENT DISTRESS. EXTERNAL TELE 
MONITOR REDS SINUS RHYTHM.. IV ACCESS IN LEFT WRIST #18 CURRENTLY RUNNING AZITHROMAX. CNA 
OBTAINED VITALS AND COMPLETED BELONGING LIST.. INATAL PHYSICAL ASSESSMENT COMPLETED. SKIN 
ASSESSMENT COMPETE, SKIN INTACT. BED IS LOW AND COKED, HOB ELEVATED IN SEMI FOWLERS, MATTHEW 
RAILS UP X2, CALL LIGHT WITHIN REACH, INFORMED USE. WILL CONTINUE TO MONITOR THROUGHOUT 
SHIFT.

## 2021-08-16 VITALS — DIASTOLIC BLOOD PRESSURE: 75 MMHG | SYSTOLIC BLOOD PRESSURE: 135 MMHG

## 2021-08-16 VITALS — DIASTOLIC BLOOD PRESSURE: 77 MMHG | SYSTOLIC BLOOD PRESSURE: 177 MMHG

## 2021-08-16 LAB
ALBUMIN SERPL BCP-MCNC: 2.4 G/DL (ref 3.4–5)
ALP SERPL-CCNC: 49 U/L (ref 46–116)
ALT SERPL W P-5'-P-CCNC: 27 U/L (ref 12–78)
AST SERPL W P-5'-P-CCNC: 44 U/L (ref 15–37)
BASOPHILS # BLD AUTO: 0 K/UL (ref 0–0.2)
BASOPHILS NFR BLD AUTO: 0.1 % (ref 0–2)
BILIRUB SERPL-MCNC: 0.3 MG/DL (ref 0.2–1)
BUN SERPL-MCNC: 21 MG/DL (ref 7–18)
CALCIUM SERPL-MCNC: 7.8 MG/DL (ref 8.5–10.1)
CHLORIDE SERPL-SCNC: 106 MMOL/L (ref 98–107)
CHOLEST SERPL-MCNC: 99 MG/DL (ref ?–200)
CO2 SERPL-SCNC: 26 MMOL/L (ref 21–32)
CREAT SERPL-MCNC: 0.9 MG/DL (ref 0.6–1.3)
EOSINOPHIL NFR BLD AUTO: 0 % (ref 0–6)
FERRITIN SERPL-MCNC: 2927 NG/ML (ref 8–388)
GLUCOSE SERPL-MCNC: 179 MG/DL (ref 74–106)
HCT VFR BLD AUTO: 37 % (ref 33–45)
HDLC SERPL-MCNC: 10 MG/DL (ref 40–60)
HGB BLD-MCNC: 12.4 G/DL (ref 11.5–14.8)
LDLC SERPL DIRECT ASSAY-MCNC: 53 MG/DL (ref 0–99)
LYMPHOCYTES NFR BLD AUTO: 0.9 K/UL (ref 0.8–4.8)
LYMPHOCYTES NFR BLD AUTO: 10.1 % (ref 20–44)
MAGNESIUM SERPL-MCNC: 2.5 MG/DL (ref 1.8–2.4)
MCHC RBC AUTO-ENTMCNC: 34 G/DL (ref 31–36)
MCV RBC AUTO: 90 FL (ref 82–100)
MONOCYTES NFR BLD AUTO: 0.6 K/UL (ref 0.1–1.3)
MONOCYTES NFR BLD AUTO: 6.6 % (ref 2–12)
NEUTROPHILS # BLD AUTO: 7.6 K/UL (ref 1.8–8.9)
NEUTROPHILS NFR BLD AUTO: 83.2 % (ref 43–81)
PHOSPHATE SERPL-MCNC: 3.4 MG/DL (ref 2.5–4.9)
PLATELET # BLD AUTO: 284 K/UL (ref 150–450)
POTASSIUM SERPL-SCNC: 4.5 MMOL/L (ref 3.5–5.1)
PROT SERPL-MCNC: 5.8 G/DL (ref 6.4–8.2)
RBC # BLD AUTO: 4.05 MIL/UL (ref 4–5.2)
SODIUM SERPL-SCNC: 141 MMOL/L (ref 136–145)
TRIGL SERPL-MCNC: 200 MG/DL (ref 30–150)
TSH SERPL DL<=0.005 MIU/L-ACNC: 0.16 UIU/ML (ref 0.36–3.74)
WBC NRBC COR # BLD AUTO: 9.1 K/UL (ref 4.3–11)

## 2021-08-16 PROCEDURE — XW033H5 INTRODUCTION OF TOCILIZUMAB INTO PERIPHERAL VEIN, PERCUTANEOUS APPROACH, NEW TECHNOLOGY GROUP 5: ICD-10-PCS | Performed by: INTERNAL MEDICINE

## 2021-08-16 PROCEDURE — XW033E5 INTRODUCTION OF REMDESIVIR ANTI-INFECTIVE INTO PERIPHERAL VEIN, PERCUTANEOUS APPROACH, NEW TECHNOLOGY GROUP 5: ICD-10-PCS | Performed by: INTERNAL MEDICINE

## 2021-08-16 RX ADMIN — Medication SCH ML: at 08:56

## 2021-08-16 RX ADMIN — METOPROLOL TARTRATE SCH MG: 25 TABLET, FILM COATED ORAL at 08:09

## 2021-08-16 RX ADMIN — DEXAMETHASONE SODIUM PHOSPHATE SCH MG: 10 INJECTION INTRAMUSCULAR; INTRAVENOUS at 08:11

## 2021-08-16 RX ADMIN — LOSARTAN POTASSIUM SCH MG: 50 TABLET, FILM COATED ORAL at 08:10

## 2021-08-16 RX ADMIN — ASPIRIN 81 MG SCH MG: 81 TABLET ORAL at 08:10

## 2021-08-16 RX ADMIN — ENOXAPARIN SODIUM SCH MG: 40 INJECTION SUBCUTANEOUS at 21:00

## 2021-08-16 RX ADMIN — DEXTROSE MONOHYDRATE SCH MLS/HR: 50 INJECTION, SOLUTION INTRAVENOUS at 22:20

## 2021-08-16 RX ADMIN — PANTOPRAZOLE SODIUM SCH MG: 40 TABLET, DELAYED RELEASE ORAL at 08:11

## 2021-08-16 RX ADMIN — Medication SCH ML: at 16:12

## 2021-08-16 RX ADMIN — METOPROLOL TARTRATE SCH MG: 25 TABLET, FILM COATED ORAL at 22:15

## 2021-08-16 RX ADMIN — DEXTROSE MONOHYDRATE SCH MLS/HR: 50 INJECTION, SOLUTION INTRAVENOUS at 23:00

## 2021-08-16 NOTE — NUR
TELE RN NOTE

PT IN BED AWAKE. A/O X 3, NO SOB NOTED. NO DISTRESS OR DISCOMFORT NOTED. PT HAS PRODUCTIVE 
COUGH. ON 5L N/C O2 SAT 93%. SL IN LT WRIST INTACT AND PATENT. ON TELE MONITOR SR 79. SIDE 
RAILS UP X 3 AND CALL LIGHT WITHIN REACH. CONTINUE TO MONITOR HER.

## 2021-08-16 NOTE — NUR
RN NOTE 



ON COVID ISOLATION. A/OX3. REMAINS ON OXYGEN 5L/MIN. NO RESP DISTRESS. NO DISTRESS. TELE 
MONITOR SINUS RHYTHM. IV  LEFT WRIST #18. BED REMAINS LOW AND LOCKED, HOB ELEVATED IN SEMI 
FOWLERS, SIDE RAILS UP X2, CALL LIGHT WITHIN REACH. WILL ENDORSE TO INCOMING SHIFT.

## 2021-08-16 NOTE — NUR
RN OPENING NOTE



RECEIVED PATIENT IN BED. A/O X 3. ON 02 5 LPM VIA NC. NO S/S OF RESPIRATORY DISTRESS. TELE 
READING SHOWS SR 70'S. DENIES ANY PAIN OR DISCOMFORT AT THIS TIME. IV ACCESS ON L WRIST #18, 
INTACT AND PATENT. SAFETY MEASURES MAINTAINED AND FALL RISK OBSERVED. BED IN LOWEST 
POSITION, BRAKES LOCKED. SIDE RAILS UP X2. CALL LIGHT WITHIN REACH. WILL CONTINUE PLAN OF 
CARE.

## 2021-08-16 NOTE — NUR
RN CLOSING NOTE



PATIENT RESTING IN BED. A/O X 3. AMBULATORY WITH ASSIST. ON 02 5 LPM VIA NC. NO SOB NOTED. 
NO S/S OF RESPIRATORY DISTRESS. TELE READING SHOWS SR 70'S. NO REPORTS OF ANY PAIN OR 
DISCOMFORT AT THIS TIME. IV ACCESS ON L WRIST #18, INTACT AND PATENT. NO SIGNS OF 
INFILTRATION. DUE MEDS GIVEN AS ORDERED. ALL NEEDS HAVE BEEN MET AND ATTENDED. SAFETY 
MEASURES MAINTAINED AND FALL RISK OBSERVED. BED IN LOWEST POSITION, BRAKES LOCKED. SIDE 
RAILS UP X2. KEPT CALL LIGHT WITHIN REACH. WILL ENDORSE CONTINUITY OF CARE TO ONCOMING 
SHIFT.

## 2021-08-17 VITALS — SYSTOLIC BLOOD PRESSURE: 180 MMHG | DIASTOLIC BLOOD PRESSURE: 56 MMHG

## 2021-08-17 VITALS — DIASTOLIC BLOOD PRESSURE: 70 MMHG | SYSTOLIC BLOOD PRESSURE: 109 MMHG

## 2021-08-17 VITALS — SYSTOLIC BLOOD PRESSURE: 137 MMHG | DIASTOLIC BLOOD PRESSURE: 64 MMHG

## 2021-08-17 VITALS — DIASTOLIC BLOOD PRESSURE: 60 MMHG | SYSTOLIC BLOOD PRESSURE: 147 MMHG

## 2021-08-17 VITALS — SYSTOLIC BLOOD PRESSURE: 139 MMHG | DIASTOLIC BLOOD PRESSURE: 64 MMHG

## 2021-08-17 LAB
ALBUMIN SERPL BCP-MCNC: 2.3 G/DL (ref 3.4–5)
ALP SERPL-CCNC: 42 U/L (ref 46–116)
ALT SERPL W P-5'-P-CCNC: 22 U/L (ref 12–78)
AST SERPL W P-5'-P-CCNC: 26 U/L (ref 15–37)
BASOPHILS # BLD AUTO: 0 K/UL (ref 0–0.2)
BASOPHILS NFR BLD AUTO: 0.1 % (ref 0–2)
BILIRUB DIRECT SERPL-MCNC: 0.1 MG/DL (ref 0–0.2)
BILIRUB SERPL-MCNC: 0.2 MG/DL (ref 0.2–1)
BUN SERPL-MCNC: 27 MG/DL (ref 7–18)
CALCIUM SERPL-MCNC: 7.9 MG/DL (ref 8.5–10.1)
CHLORIDE SERPL-SCNC: 108 MMOL/L (ref 98–107)
CO2 SERPL-SCNC: 27 MMOL/L (ref 21–32)
CREAT SERPL-MCNC: 1 MG/DL (ref 0.6–1.3)
CRP SERPL-MCNC: 3.6 MG/DL (ref 0–0.9)
EOSINOPHIL NFR BLD AUTO: 0 % (ref 0–6)
GLUCOSE SERPL-MCNC: 176 MG/DL (ref 74–106)
HCT VFR BLD AUTO: 35 % (ref 33–45)
HGB BLD-MCNC: 11.5 G/DL (ref 11.5–14.8)
LYMPHOCYTES NFR BLD AUTO: 1.1 K/UL (ref 0.8–4.8)
LYMPHOCYTES NFR BLD AUTO: 9.6 % (ref 20–44)
MAGNESIUM SERPL-MCNC: 2.4 MG/DL (ref 1.8–2.4)
MCHC RBC AUTO-ENTMCNC: 33 G/DL (ref 31–36)
MCV RBC AUTO: 91 FL (ref 82–100)
MONOCYTES NFR BLD AUTO: 0.8 K/UL (ref 0.1–1.3)
MONOCYTES NFR BLD AUTO: 6.6 % (ref 2–12)
NEUTROPHILS # BLD AUTO: 9.5 K/UL (ref 1.8–8.9)
NEUTROPHILS NFR BLD AUTO: 83.7 % (ref 43–81)
PHOSPHATE SERPL-MCNC: 3.1 MG/DL (ref 2.5–4.9)
PLATELET # BLD AUTO: 323 K/UL (ref 150–450)
POTASSIUM SERPL-SCNC: 4.2 MMOL/L (ref 3.5–5.1)
PROT SERPL-MCNC: 5.5 G/DL (ref 6.4–8.2)
RBC # BLD AUTO: 3.86 MIL/UL (ref 4–5.2)
SODIUM SERPL-SCNC: 143 MMOL/L (ref 136–145)
WBC NRBC COR # BLD AUTO: 11.4 K/UL (ref 4.3–11)

## 2021-08-17 RX ADMIN — Medication SCH ML: at 08:35

## 2021-08-17 RX ADMIN — DEXTROSE MONOHYDRATE SCH MLS/HR: 50 INJECTION, SOLUTION INTRAVENOUS at 21:40

## 2021-08-17 RX ADMIN — METOPROLOL TARTRATE SCH MG: 25 TABLET, FILM COATED ORAL at 21:37

## 2021-08-17 RX ADMIN — SODIUM CHLORIDE SCH MLS/HR: 9 INJECTION, SOLUTION INTRAVENOUS at 12:24

## 2021-08-17 RX ADMIN — ASPIRIN 81 MG SCH MG: 81 TABLET ORAL at 08:34

## 2021-08-17 RX ADMIN — ENOXAPARIN SODIUM SCH MG: 40 INJECTION SUBCUTANEOUS at 21:38

## 2021-08-17 RX ADMIN — DEXTROSE MONOHYDRATE SCH MLS/HR: 50 INJECTION, SOLUTION INTRAVENOUS at 22:25

## 2021-08-17 RX ADMIN — Medication SCH ML: at 17:03

## 2021-08-17 RX ADMIN — DEXAMETHASONE SODIUM PHOSPHATE SCH MG: 10 INJECTION INTRAMUSCULAR; INTRAVENOUS at 08:34

## 2021-08-17 RX ADMIN — ACETAMINOPHEN PRN MG: 325 TABLET ORAL at 02:22

## 2021-08-17 RX ADMIN — PANTOPRAZOLE SODIUM SCH MG: 40 TABLET, DELAYED RELEASE ORAL at 08:34

## 2021-08-17 RX ADMIN — METOPROLOL TARTRATE SCH MG: 25 TABLET, FILM COATED ORAL at 08:35

## 2021-08-17 RX ADMIN — LOSARTAN POTASSIUM SCH MG: 50 TABLET, FILM COATED ORAL at 08:34

## 2021-08-17 NOTE — NUR
RN NOTE

PATIENT OBSERVED IN BED AWAKE, ALERT AND ORIENTED X4, ON NC @5LPM O2 SAT OF 97% AT THIS TIME 
BREATHING EVEN AND UNLABORED, ON TELE MONITOR SR HR OF 77, WITH RIGHT WRIST G22 IV PATENT 
FLUSHING WELL, ON REMDESIVIR TREATMENT, WILL CONTINUE TO MONITOR, BED WHEEL LOCK, SAFETY 
MEASURE OBSERVED, CALL LIGHT WITHIN REACH.

## 2021-08-17 NOTE — NUR
RN NOTE

PATIENT IN BED A/O X3,  ON NC @5LPM O2 SAT > 94%, NO SOB/ACUTE DISTRESS NOTED,ON TELE 
MONITOR SR HR OF 70S,IV ACCESS RIGHT WRIST G22, PATENT AND INTACT, ALL SAFETY PRECAUTIONS IN 
PLACED, CALL LIGHT WITHIN REACH, WILL CONTINUE TO MONITOR CLOSELY.

## 2021-08-17 NOTE — NUR
TELE RN NOTE

RAPID COVID 19 SPECIMEN COLLECTED AND SENT TO LAB. ALSO HYDRALAZINE 25 MG PO GIVEN FOR HIGH 
B/P. PT IV SITE GOT INFILTRATED. REMOVED THE SL AND SECURED WITH 2X2 GAUZE. PT IS HARD 
STICK. CALLED ER NURSE FOR HELP.

## 2021-08-17 NOTE — NUR
TELE RN NOTE

PT IN BED ASLEEP, AROUSABLE. NO DISTRESS OR DISCOMFORT NOTED. NO S/S OF PAIN NOTED. RT WRIST 
SL INTACT AND PATENT. ON TELE SR 70'S. SIDE RAILS UP X 2 AND CALL LIGHT WITHIN REACH. WILL 
ENDORSE TO DAY SHIFT NURSE FOR CONTINUE TO CARE.

## 2021-08-17 NOTE — NUR
RN NOTE

PATIENT OBSERVED IN BED AWAKE, ALERT AND ORIENTED X4, ON NC @5LPM O2 SAT OF 95% AT THIS TIME 
BREATHING EVEN AND UNLABORED, ON TELE MONITOR SR HR OF 77, WITH RIGHT WRIST G22 IV PATENT 
FLUSHING WELL, ON REMDESIVIR TREATMENT NO ASE NOTED,ON DVT PPX NO BLEEDING NOTED, WILL 
CONTINUE TO MONITOR, BED WHEEL LOCK, SAFETY MEASURE OBSERVED, CALL LIGHT WITHIN REACH.

## 2021-08-18 VITALS — SYSTOLIC BLOOD PRESSURE: 166 MMHG | DIASTOLIC BLOOD PRESSURE: 81 MMHG

## 2021-08-18 VITALS — DIASTOLIC BLOOD PRESSURE: 69 MMHG | SYSTOLIC BLOOD PRESSURE: 120 MMHG

## 2021-08-18 VITALS — DIASTOLIC BLOOD PRESSURE: 83 MMHG | SYSTOLIC BLOOD PRESSURE: 124 MMHG

## 2021-08-18 VITALS — DIASTOLIC BLOOD PRESSURE: 88 MMHG | SYSTOLIC BLOOD PRESSURE: 141 MMHG

## 2021-08-18 VITALS — DIASTOLIC BLOOD PRESSURE: 71 MMHG | SYSTOLIC BLOOD PRESSURE: 147 MMHG

## 2021-08-18 VITALS — DIASTOLIC BLOOD PRESSURE: 62 MMHG | SYSTOLIC BLOOD PRESSURE: 144 MMHG

## 2021-08-18 LAB
ALBUMIN SERPL BCP-MCNC: 2.5 G/DL (ref 3.4–5)
ALP SERPL-CCNC: 44 U/L (ref 46–116)
ALT SERPL W P-5'-P-CCNC: 22 U/L (ref 12–78)
AST SERPL W P-5'-P-CCNC: 30 U/L (ref 15–37)
BASOPHILS # BLD AUTO: 0 K/UL (ref 0–0.2)
BASOPHILS NFR BLD AUTO: 0.4 % (ref 0–2)
BILIRUB DIRECT SERPL-MCNC: 0.1 MG/DL (ref 0–0.2)
BILIRUB SERPL-MCNC: 0.4 MG/DL (ref 0.2–1)
BUN SERPL-MCNC: 24 MG/DL (ref 7–18)
CALCIUM SERPL-MCNC: 8.3 MG/DL (ref 8.5–10.1)
CHLORIDE SERPL-SCNC: 105 MMOL/L (ref 98–107)
CO2 SERPL-SCNC: 27 MMOL/L (ref 21–32)
CREAT SERPL-MCNC: 0.9 MG/DL (ref 0.6–1.3)
CRP SERPL-MCNC: 2.8 MG/DL (ref 0–0.9)
EOSINOPHIL NFR BLD AUTO: 0 % (ref 0–6)
GLUCOSE SERPL-MCNC: 151 MG/DL (ref 74–106)
HCT VFR BLD AUTO: 36 % (ref 33–45)
HGB BLD-MCNC: 12.1 G/DL (ref 11.5–14.8)
LYMPHOCYTES NFR BLD AUTO: 1.2 K/UL (ref 0.8–4.8)
LYMPHOCYTES NFR BLD AUTO: 11.9 % (ref 20–44)
MCHC RBC AUTO-ENTMCNC: 33 G/DL (ref 31–36)
MCV RBC AUTO: 90 FL (ref 82–100)
MONOCYTES NFR BLD AUTO: 0.7 K/UL (ref 0.1–1.3)
MONOCYTES NFR BLD AUTO: 6.5 % (ref 2–12)
NEUTROPHILS # BLD AUTO: 8.4 K/UL (ref 1.8–8.9)
NEUTROPHILS NFR BLD AUTO: 81.2 % (ref 43–81)
PLATELET # BLD AUTO: 365 K/UL (ref 150–450)
POTASSIUM SERPL-SCNC: 4 MMOL/L (ref 3.5–5.1)
PROT SERPL-MCNC: 5.9 G/DL (ref 6.4–8.2)
RBC # BLD AUTO: 4.03 MIL/UL (ref 4–5.2)
SODIUM SERPL-SCNC: 141 MMOL/L (ref 136–145)
WBC NRBC COR # BLD AUTO: 10.3 K/UL (ref 4.3–11)

## 2021-08-18 RX ADMIN — METOPROLOL TARTRATE SCH MG: 25 TABLET, FILM COATED ORAL at 08:57

## 2021-08-18 RX ADMIN — Medication SCH ML: at 16:01

## 2021-08-18 RX ADMIN — SODIUM CHLORIDE SCH MLS/HR: 9 INJECTION, SOLUTION INTRAVENOUS at 11:52

## 2021-08-18 RX ADMIN — LOSARTAN POTASSIUM SCH MG: 50 TABLET, FILM COATED ORAL at 08:57

## 2021-08-18 RX ADMIN — PANTOPRAZOLE SODIUM SCH MG: 40 TABLET, DELAYED RELEASE ORAL at 08:06

## 2021-08-18 RX ADMIN — Medication SCH ML: at 08:53

## 2021-08-18 RX ADMIN — OXYCODONE HYDROCHLORIDE AND ACETAMINOPHEN SCH MG: 500 TABLET ORAL at 08:56

## 2021-08-18 RX ADMIN — METOPROLOL TARTRATE SCH MG: 25 TABLET, FILM COATED ORAL at 20:47

## 2021-08-18 RX ADMIN — ENOXAPARIN SODIUM SCH MG: 40 INJECTION SUBCUTANEOUS at 20:49

## 2021-08-18 RX ADMIN — ASPIRIN 81 MG SCH MG: 81 TABLET ORAL at 08:57

## 2021-08-18 RX ADMIN — DEXAMETHASONE SODIUM PHOSPHATE SCH MG: 10 INJECTION INTRAMUSCULAR; INTRAVENOUS at 08:57

## 2021-08-18 RX ADMIN — VITAMIN D, TAB 1000IU (100/BT) SCH UNIT: 25 TAB at 08:57

## 2021-08-18 RX ADMIN — DEXTROSE MONOHYDRATE SCH MLS/HR: 50 INJECTION, SOLUTION INTRAVENOUS at 23:02

## 2021-08-18 RX ADMIN — Medication SCH MG: at 08:57

## 2021-08-18 RX ADMIN — DEXTROSE MONOHYDRATE SCH MLS/HR: 50 INJECTION, SOLUTION INTRAVENOUS at 21:51

## 2021-08-18 NOTE — NUR
RN OPENING NOTE

RECEIVED PT AWAKE IN BED. A/OX3. PT STABLE ON 4L OXYGEN VIA NC, SR @61, NO SOB OR 
RESPIRATORY DISTRESS NOTED. NO C/O PAIN AT THIS TIME. IV ACCESS IN R WRIST G#22. IV IS 
INTACT, PATENT AND FLUSHING WELL. SAFETY MEASURES MAINTAINED AT ALL TIMES. BED IN LOWEST 
LOCKED POSITION, HOB ELEVATED, SIDE RAILS UPX2. CALL LIGHT AND TABLE WITHIN REACH. WILL 
CONTINUE WITH PLAN OF CARE.

## 2021-08-18 NOTE — NUR
RN NOTE

PATIENT OBSERVED IN BED AWAKE, ALERT AND ORIENTED X4, ON NC @4LPM O2 SAT OF 96% AT THIS TIME 
BREATHING EVEN AND UNLABORED WILL TITRATE O2 AS TOLERATED, ON TELE MONITOR SR HR OF 71, WITH 
RIGHT WRIST G22 IV PATENT FLUSHING WELL, 2ND DOSE OF REMDESIVIR TREATMENT GIVEN NO ASE 
NOTED, ON DVT PPX NO BLEEDING NOTED, WILL CONTINUE TO MONITOR, BED WHEEL LOCK, SAFETY 
MEASURE OBSERVED, CALL LIGHT WITHIN REACH. WILL ENDOSE TO NOC SHIFT

## 2021-08-18 NOTE — NUR
RN NOTE

PATIENT OBSERVED IN BED AWAKE, ALERT AND ORIENTED X4, ON NC @5LPM O2 SAT OF 96% AT THIS TIME 
BREATHING EVEN AND UNLABORED WILL TITRATE O2 AS TOLERATED, ON TELE MONITOR SR HR OF 81, WITH 
RIGHT WRIST G22 IV PATENT FLUSHING WELL, ON REMDESIVIR TREATMENT NO ASE NOTED, ON DVT PPX NO 
BLEEDING NOTED, WILL CONTINUE TO MONITOR, BED WHEEL LOCK, SAFETY MEASURE OBSERVED, CALL 
LIGHT WITHIN REACH.

## 2021-08-18 NOTE — NUR
RN NOTE

PATIENT IN BED ASLEEP, AROUSES TO VERBAL STIMULI,  ON NC @5LPM O2 SAT > 94%, NO SOB/ACUTE 
DISTRESS NOTED DURING THE NIGHT, NO EPISODES OF DESATURATION,  ALL SAFETY PRECAUTIONS IN 
PLACED, NO SIGNIFICANT CHANGE IN CONDITION, CALL LIGHT WITHIN REACH, WILL ENDORSE CONTINUITY 
OFF CARE TO ONCOMING NURSE.

## 2021-08-19 VITALS — SYSTOLIC BLOOD PRESSURE: 177 MMHG | DIASTOLIC BLOOD PRESSURE: 84 MMHG

## 2021-08-19 VITALS — SYSTOLIC BLOOD PRESSURE: 158 MMHG | DIASTOLIC BLOOD PRESSURE: 72 MMHG

## 2021-08-19 VITALS — SYSTOLIC BLOOD PRESSURE: 135 MMHG | DIASTOLIC BLOOD PRESSURE: 69 MMHG

## 2021-08-19 VITALS — DIASTOLIC BLOOD PRESSURE: 84 MMHG | SYSTOLIC BLOOD PRESSURE: 177 MMHG

## 2021-08-19 VITALS — SYSTOLIC BLOOD PRESSURE: 174 MMHG | DIASTOLIC BLOOD PRESSURE: 69 MMHG

## 2021-08-19 VITALS — SYSTOLIC BLOOD PRESSURE: 165 MMHG | DIASTOLIC BLOOD PRESSURE: 73 MMHG

## 2021-08-19 VITALS — DIASTOLIC BLOOD PRESSURE: 88 MMHG | SYSTOLIC BLOOD PRESSURE: 178 MMHG

## 2021-08-19 LAB
ALBUMIN SERPL BCP-MCNC: 2.5 G/DL (ref 3.4–5)
ALP SERPL-CCNC: 47 U/L (ref 46–116)
ALT SERPL W P-5'-P-CCNC: 33 U/L (ref 12–78)
AST SERPL W P-5'-P-CCNC: 46 U/L (ref 15–37)
BASOPHILS # BLD AUTO: 0 K/UL (ref 0–0.2)
BASOPHILS NFR BLD AUTO: 0.3 % (ref 0–2)
BILIRUB DIRECT SERPL-MCNC: 0.1 MG/DL (ref 0–0.2)
BILIRUB SERPL-MCNC: 0.3 MG/DL (ref 0.2–1)
BUN SERPL-MCNC: 22 MG/DL (ref 7–18)
CALCIUM SERPL-MCNC: 8.2 MG/DL (ref 8.5–10.1)
CHLORIDE SERPL-SCNC: 103 MMOL/L (ref 98–107)
CO2 SERPL-SCNC: 28 MMOL/L (ref 21–32)
CREAT SERPL-MCNC: 0.9 MG/DL (ref 0.6–1.3)
EOSINOPHIL NFR BLD AUTO: 0.1 % (ref 0–6)
GLUCOSE SERPL-MCNC: 153 MG/DL (ref 74–106)
HCT VFR BLD AUTO: 38 % (ref 33–45)
HGB BLD-MCNC: 12.3 G/DL (ref 11.5–14.8)
LYMPHOCYTES NFR BLD AUTO: 1.4 K/UL (ref 0.8–4.8)
LYMPHOCYTES NFR BLD AUTO: 11.8 % (ref 20–44)
MCHC RBC AUTO-ENTMCNC: 33 G/DL (ref 31–36)
MCV RBC AUTO: 91 FL (ref 82–100)
MONOCYTES NFR BLD AUTO: 0.8 K/UL (ref 0.1–1.3)
MONOCYTES NFR BLD AUTO: 6.6 % (ref 2–12)
NEUTROPHILS # BLD AUTO: 9.4 K/UL (ref 1.8–8.9)
NEUTROPHILS NFR BLD AUTO: 81.2 % (ref 43–81)
PLATELET # BLD AUTO: 372 K/UL (ref 150–450)
POTASSIUM SERPL-SCNC: 4.2 MMOL/L (ref 3.5–5.1)
PROT SERPL-MCNC: 5.7 G/DL (ref 6.4–8.2)
RBC # BLD AUTO: 4.15 MIL/UL (ref 4–5.2)
SODIUM SERPL-SCNC: 139 MMOL/L (ref 136–145)
WBC NRBC COR # BLD AUTO: 11.6 K/UL (ref 4.3–11)

## 2021-08-19 RX ADMIN — ENOXAPARIN SODIUM SCH MG: 40 INJECTION SUBCUTANEOUS at 20:41

## 2021-08-19 RX ADMIN — METOPROLOL TARTRATE SCH MG: 25 TABLET, FILM COATED ORAL at 08:20

## 2021-08-19 RX ADMIN — VITAMIN D, TAB 1000IU (100/BT) SCH UNIT: 25 TAB at 08:16

## 2021-08-19 RX ADMIN — Medication SCH MG: at 08:17

## 2021-08-19 RX ADMIN — DEXTROSE MONOHYDRATE SCH MLS/HR: 50 INJECTION, SOLUTION INTRAVENOUS at 21:31

## 2021-08-19 RX ADMIN — SODIUM CHLORIDE SCH MLS/HR: 9 INJECTION, SOLUTION INTRAVENOUS at 12:04

## 2021-08-19 RX ADMIN — ACETAMINOPHEN PRN MG: 325 TABLET ORAL at 01:49

## 2021-08-19 RX ADMIN — ASPIRIN 81 MG SCH MG: 81 TABLET ORAL at 08:17

## 2021-08-19 RX ADMIN — DEXTROSE MONOHYDRATE SCH MLS/HR: 50 INJECTION, SOLUTION INTRAVENOUS at 22:12

## 2021-08-19 RX ADMIN — LOSARTAN POTASSIUM SCH MG: 50 TABLET, FILM COATED ORAL at 08:20

## 2021-08-19 RX ADMIN — PANTOPRAZOLE SODIUM SCH MG: 40 TABLET, DELAYED RELEASE ORAL at 08:20

## 2021-08-19 RX ADMIN — OXYCODONE HYDROCHLORIDE AND ACETAMINOPHEN SCH MG: 500 TABLET ORAL at 08:16

## 2021-08-19 RX ADMIN — DEXAMETHASONE SODIUM PHOSPHATE SCH MG: 10 INJECTION INTRAMUSCULAR; INTRAVENOUS at 08:17

## 2021-08-19 RX ADMIN — Medication SCH ML: at 17:11

## 2021-08-19 RX ADMIN — Medication SCH ML: at 09:25

## 2021-08-19 RX ADMIN — METOPROLOL TARTRATE SCH MG: 25 TABLET, FILM COATED ORAL at 20:46

## 2021-08-19 NOTE — NUR
RN CLOSING NOTE

PT IS IN BED, EASY TO AROUSE. A/OX3. PT IS STABLE ON OXYGEN 4L NC. NO SOB OR RESPIRATORY 
DISTRESS NOTED. PT IS BR. IV ACCESS L FOREARM G #22 IS INTACT, PATENT, AND FLUSHING WELL. 
ALL NEEDS HAVE BEEN MET. ALL CARE, NEEDS, MEDICATIONS, AND TREATMENT ADMINISTERED AS 
ANTICIPATED PER ORDER. PAIN MANAGEMENT ADMINISTERED PER ORDER. PT ENCOURAGED TO REPOSITION 
Q2H AND PRN. SAFETY, SEIZURE, AND ASPIRATION PRECAUTIONS MAINTAINED AT ALL TIMES. BED IN 
LOWEST LOCKED POSITION, HOB ELEVATED, SIDE RAILS UP X2. CALL LIGHT AND TABLE WITHIN REACH. 
WILL ENDORSE TO ONCOMING NURSE FOR VALARIE.

## 2021-08-19 NOTE — NUR
PT  /78, RR 20, T 97.5, 02 SAT 96% ON 4L OXYGEN VIA NC. HYDRALAZINE 25MG PO Q6H PRN 
SBP> ADMINISTERED PER ORDER. WILL CONTINUE TO MONITOR.

## 2021-08-19 NOTE — NUR
RN OPENING NOTE



PATIENT IN BED, AWAKE. A/O X 3, PATIENT CURRENTLY ON 4 L OF O2 SUPPLEMENTATION, BREATHING 
EVEN AND UNLABORED. PATIENT IS ABLE TO MAKE NEEDS KNOWN. L FOREARM 22 G PATENT AND INTACT. 
NO COMPLAINS OF PAIN AT THIS TIME. SAFETY MEASURES IN PLACE: BED LOCKED AND IN LOWEST 
POSITION, CALL LIGHT WITHIN REACH, SIDE RAILS UP. ISOLATION PRECAUTIONS MAINTAINED. WILL 
MONITOR PATIENT CLOSELY.

## 2021-08-19 NOTE — NUR
RN NOTE

PATIENT IS IN BED WITH HOB AT SEMI GROSSMAN'S POSITION. PATIENT IS ON 4L NC WITH NO SIGNS OF 
LABORED BREATHING. LFOREARM 22 IS PATENT AND INTACT. BED IS LOCKED IN THE LOWEST POSITION, 3 
GUARD RAILS RAISED, CALL BELL WITHIN REACH, AND ALL HOSPITAL SAFETY PRECAUTIONS ARE BEING 
FOLLOWED. WILL CONTINUE OT MONITOR THROUGHOUT SHIFT.

## 2021-08-19 NOTE — NUR
RN NOTE



/84, NOTIFIED MD OF SITUATION. CHANGED HYDRALAZINE ORDER TO Q4H PRN, WILL ADMINISTER 
MED AND RECHECK BP.

-------------------------------------------------------------------------------

Addendum: 08/19/21 at 2057 by YOLANDA CHAUDHARI RN

-------------------------------------------------------------------------------

PATIENT HAS SCHEDULED METOPROLOL. MED GIVEN, WILL RECHECK, IF BP STILL HIGH, WILL GIVE PRN 
HYDRALAZINE

## 2021-08-19 NOTE — NUR
RN NOTE

OKAY TO ADMINISTER Q6 PRN HYDRALAZINE 4 HOURS AFTER LAST DOSE PER DR. COVARRUBIAS FOR BP /73. 
WILL CONTINUE TO MONITOR.

## 2021-08-19 NOTE — NUR
PT C/O ACHING PAIN 3/10 ON HER R WRIST , /78, RR 20, T 97.5, 02 SAT 96% ON 4L OXYGEN 
VIA NC PER PT REQUEST TYLENOL 650MG PO Q6HR PRN ADMINISTERED PER ORDER. WILL CONTINUE TO 
MONITOR.

## 2021-08-20 VITALS — DIASTOLIC BLOOD PRESSURE: 62 MMHG | SYSTOLIC BLOOD PRESSURE: 147 MMHG

## 2021-08-20 VITALS — SYSTOLIC BLOOD PRESSURE: 145 MMHG | DIASTOLIC BLOOD PRESSURE: 62 MMHG

## 2021-08-20 VITALS — DIASTOLIC BLOOD PRESSURE: 66 MMHG | SYSTOLIC BLOOD PRESSURE: 149 MMHG

## 2021-08-20 VITALS — SYSTOLIC BLOOD PRESSURE: 149 MMHG | DIASTOLIC BLOOD PRESSURE: 66 MMHG

## 2021-08-20 VITALS — DIASTOLIC BLOOD PRESSURE: 60 MMHG | SYSTOLIC BLOOD PRESSURE: 140 MMHG

## 2021-08-20 VITALS — SYSTOLIC BLOOD PRESSURE: 131 MMHG | DIASTOLIC BLOOD PRESSURE: 70 MMHG

## 2021-08-20 VITALS — DIASTOLIC BLOOD PRESSURE: 60 MMHG | SYSTOLIC BLOOD PRESSURE: 147 MMHG

## 2021-08-20 LAB
ALBUMIN SERPL BCP-MCNC: 2.5 G/DL (ref 3.4–5)
ALP SERPL-CCNC: 44 U/L (ref 46–116)
ALT SERPL W P-5'-P-CCNC: 40 U/L (ref 12–78)
AST SERPL W P-5'-P-CCNC: 35 U/L (ref 15–37)
BASOPHILS # BLD AUTO: 0 K/UL (ref 0–0.2)
BASOPHILS NFR BLD AUTO: 0.4 % (ref 0–2)
BILIRUB DIRECT SERPL-MCNC: 0.1 MG/DL (ref 0–0.2)
BILIRUB SERPL-MCNC: 0.3 MG/DL (ref 0.2–1)
BUN SERPL-MCNC: 24 MG/DL (ref 7–18)
CALCIUM SERPL-MCNC: 8.2 MG/DL (ref 8.5–10.1)
CHLORIDE SERPL-SCNC: 102 MMOL/L (ref 98–107)
CO2 SERPL-SCNC: 27 MMOL/L (ref 21–32)
CREAT SERPL-MCNC: 0.8 MG/DL (ref 0.6–1.3)
EOSINOPHIL NFR BLD AUTO: 0.2 % (ref 0–6)
GLUCOSE SERPL-MCNC: 128 MG/DL (ref 74–106)
HCT VFR BLD AUTO: 39 % (ref 33–45)
HGB BLD-MCNC: 12.8 G/DL (ref 11.5–14.8)
LYMPHOCYTES NFR BLD AUTO: 1.6 K/UL (ref 0.8–4.8)
LYMPHOCYTES NFR BLD AUTO: 13.6 % (ref 20–44)
MCHC RBC AUTO-ENTMCNC: 33 G/DL (ref 31–36)
MCV RBC AUTO: 90 FL (ref 82–100)
MONOCYTES NFR BLD AUTO: 0.8 K/UL (ref 0.1–1.3)
MONOCYTES NFR BLD AUTO: 7.1 % (ref 2–12)
NEUTROPHILS # BLD AUTO: 9.1 K/UL (ref 1.8–8.9)
NEUTROPHILS NFR BLD AUTO: 78.7 % (ref 43–81)
PLATELET # BLD AUTO: 407 K/UL (ref 150–450)
POTASSIUM SERPL-SCNC: 4.4 MMOL/L (ref 3.5–5.1)
PROT SERPL-MCNC: 5.9 G/DL (ref 6.4–8.2)
RBC # BLD AUTO: 4.28 MIL/UL (ref 4–5.2)
SODIUM SERPL-SCNC: 139 MMOL/L (ref 136–145)
WBC NRBC COR # BLD AUTO: 11.6 K/UL (ref 4.3–11)

## 2021-08-20 RX ADMIN — VITAMIN D, TAB 1000IU (100/BT) SCH UNIT: 25 TAB at 09:11

## 2021-08-20 RX ADMIN — METOPROLOL TARTRATE SCH MG: 25 TABLET, FILM COATED ORAL at 09:14

## 2021-08-20 RX ADMIN — ENOXAPARIN SODIUM SCH MG: 40 INJECTION SUBCUTANEOUS at 20:42

## 2021-08-20 RX ADMIN — Medication SCH ML: at 16:31

## 2021-08-20 RX ADMIN — SODIUM CHLORIDE SCH MLS/HR: 9 INJECTION, SOLUTION INTRAVENOUS at 12:07

## 2021-08-20 RX ADMIN — ASPIRIN 81 MG SCH MG: 81 TABLET ORAL at 09:14

## 2021-08-20 RX ADMIN — DEXAMETHASONE SODIUM PHOSPHATE SCH MG: 10 INJECTION INTRAMUSCULAR; INTRAVENOUS at 09:15

## 2021-08-20 RX ADMIN — LOSARTAN POTASSIUM SCH MG: 50 TABLET, FILM COATED ORAL at 09:14

## 2021-08-20 RX ADMIN — Medication SCH MG: at 09:11

## 2021-08-20 RX ADMIN — PANTOPRAZOLE SODIUM SCH MG: 40 TABLET, DELAYED RELEASE ORAL at 09:20

## 2021-08-20 RX ADMIN — Medication SCH ML: at 09:15

## 2021-08-20 RX ADMIN — METOPROLOL TARTRATE SCH MG: 25 TABLET, FILM COATED ORAL at 20:31

## 2021-08-20 RX ADMIN — OXYCODONE HYDROCHLORIDE AND ACETAMINOPHEN SCH MG: 500 TABLET ORAL at 09:12

## 2021-08-20 NOTE — NUR
RN CLOSING NOTE



PATIENT IN BED EYES CLOSED, EASILY AWAKENED. NOT IN ANY APPARENT DISTRESS. NO SIGNIFICANT 
CHANGES DURING THE SHIFT. ENDORSED TO RN FELIX FOR VALARIE.

## 2021-08-20 NOTE — NUR
TELE RN OPENING NOTE



RECEIVED PT IN BED WITH EYES CLOSED, AROUSABLE TO STIMULATION. A/O X3. PT IS ON 2 LPM O2 VIA 
NC SATURATING AT 95%. NO SOB OR S/S OF RESPIRATORY DISTRESS NOTED. PT ON EXTERNAL CARDIAC 
MONITOR READING SR AT 69 BPM. PT HAS NO C/O PAIN OR DISCOMFORT AT THIS TIME. IV ACCESS IN 
LFA #22, INTACT AND PATENT. SAFETY MEASURES MAINTAINED. BED IN LOWEST LOCKED POSITION, HOB 
ELEVATED, SIDE RAILS UP X2. CALL LIGHT AND TABLE WITHIN REACH. WILL CONTINUE WITH PLAN OF 
CARE.

## 2021-08-20 NOTE — NUR
RN OPENING NOTES

Patient was received in bed. On 4 lpm via n/c. HOB kept elevated. No s/s of respiratory 
distress. No c/o pain or discomfort. Iv site to left foreamr noted with saline lock. Will 
continue to monitor. Call light with in reach.

-------------------------------------------------------------------------------

Addendum: 08/20/21 at 1827 by STEFANIE LYON RN

-------------------------------------------------------------------------------

WRONG ENTRY; DISREGARD

## 2021-08-20 NOTE — NUR
RN OPENING NOTES

Patient was received in bed. On 4 lpm via n/c. HOB kept elevated. No s/s of respiratory 
distress. No c/o pain or discomfort. Iv site to left foreamr noted with saline lock. Will 
continue to monitor. Call light with in reach.

## 2021-08-21 VITALS — SYSTOLIC BLOOD PRESSURE: 122 MMHG | DIASTOLIC BLOOD PRESSURE: 63 MMHG

## 2021-08-21 VITALS — DIASTOLIC BLOOD PRESSURE: 62 MMHG | SYSTOLIC BLOOD PRESSURE: 135 MMHG

## 2021-08-21 VITALS — SYSTOLIC BLOOD PRESSURE: 145 MMHG | DIASTOLIC BLOOD PRESSURE: 67 MMHG

## 2021-08-21 VITALS — SYSTOLIC BLOOD PRESSURE: 123 MMHG | DIASTOLIC BLOOD PRESSURE: 61 MMHG

## 2021-08-21 VITALS — SYSTOLIC BLOOD PRESSURE: 140 MMHG | DIASTOLIC BLOOD PRESSURE: 65 MMHG

## 2021-08-21 VITALS — DIASTOLIC BLOOD PRESSURE: 52 MMHG | SYSTOLIC BLOOD PRESSURE: 102 MMHG

## 2021-08-21 LAB
BASOPHILS # BLD AUTO: 0 K/UL (ref 0–0.2)
BASOPHILS NFR BLD AUTO: 0.1 % (ref 0–2)
BUN SERPL-MCNC: 26 MG/DL (ref 7–18)
CALCIUM SERPL-MCNC: 8.4 MG/DL (ref 8.5–10.1)
CHLORIDE SERPL-SCNC: 102 MMOL/L (ref 98–107)
CO2 SERPL-SCNC: 25 MMOL/L (ref 21–32)
CREAT SERPL-MCNC: 0.9 MG/DL (ref 0.6–1.3)
EOSINOPHIL NFR BLD AUTO: 0.1 % (ref 0–6)
GLUCOSE SERPL-MCNC: 137 MG/DL (ref 74–106)
HCT VFR BLD AUTO: 38 % (ref 33–45)
HGB BLD-MCNC: 12.9 G/DL (ref 11.5–14.8)
LYMPHOCYTES NFR BLD AUTO: 1.6 K/UL (ref 0.8–4.8)
LYMPHOCYTES NFR BLD AUTO: 12.5 % (ref 20–44)
MCHC RBC AUTO-ENTMCNC: 34 G/DL (ref 31–36)
MCV RBC AUTO: 90 FL (ref 82–100)
MONOCYTES NFR BLD AUTO: 1 K/UL (ref 0.1–1.3)
MONOCYTES NFR BLD AUTO: 7.8 % (ref 2–12)
NEUTROPHILS # BLD AUTO: 10.1 K/UL (ref 1.8–8.9)
NEUTROPHILS NFR BLD AUTO: 79.5 % (ref 43–81)
PLATELET # BLD AUTO: 428 K/UL (ref 150–450)
POTASSIUM SERPL-SCNC: 4.6 MMOL/L (ref 3.5–5.1)
RBC # BLD AUTO: 4.26 MIL/UL (ref 4–5.2)
SODIUM SERPL-SCNC: 138 MMOL/L (ref 136–145)
WBC NRBC COR # BLD AUTO: 12.7 K/UL (ref 4.3–11)

## 2021-08-21 RX ADMIN — METOPROLOL TARTRATE SCH MG: 25 TABLET, FILM COATED ORAL at 21:43

## 2021-08-21 RX ADMIN — ASPIRIN 81 MG SCH MG: 81 TABLET ORAL at 08:55

## 2021-08-21 RX ADMIN — DEXAMETHASONE SODIUM PHOSPHATE SCH MG: 10 INJECTION INTRAMUSCULAR; INTRAVENOUS at 08:55

## 2021-08-21 RX ADMIN — ENOXAPARIN SODIUM SCH MG: 40 INJECTION SUBCUTANEOUS at 21:43

## 2021-08-21 RX ADMIN — VITAMIN D, TAB 1000IU (100/BT) SCH UNIT: 25 TAB at 08:55

## 2021-08-21 RX ADMIN — METOPROLOL TARTRATE SCH MG: 25 TABLET, FILM COATED ORAL at 08:55

## 2021-08-21 RX ADMIN — LOSARTAN POTASSIUM SCH MG: 50 TABLET, FILM COATED ORAL at 08:56

## 2021-08-21 RX ADMIN — Medication SCH MG: at 08:55

## 2021-08-21 RX ADMIN — Medication SCH ML: at 16:40

## 2021-08-21 RX ADMIN — OXYCODONE HYDROCHLORIDE AND ACETAMINOPHEN SCH MG: 500 TABLET ORAL at 08:55

## 2021-08-21 RX ADMIN — PANTOPRAZOLE SODIUM SCH MG: 40 TABLET, DELAYED RELEASE ORAL at 08:24

## 2021-08-21 RX ADMIN — Medication SCH ML: at 08:56

## 2021-08-21 NOTE — NUR
TELE RN CLOSING NOTES



PATIENT IS AWAKE IN BED. ALERT AND ORIENTED X3. NO SIGNS OR SYMPTOMS OF DISTRESS NOTED. 
BREATHING IS EVEN AND UNLABORED. NO COMPLAINTS OF PAIN AT THIS TIME. IV ACCESS LFA#22 PATENT 
AND INTACT, FLUSHING WELL. PATIENT IS ON NC 2L WITH SPO2 SATURATING AT 94-96 %. PATIENT 
REMOVED FROM EXTERNAL TELE MONITOR PER MD ORDER. SAFETY MEASURES MAINTAINED WITH  BED LOCKED 
AT LOW POSITION, SIDE RAILS UP X 2. WILL ENDORSE CONTINUITY OF CARE TO ONCOMING SHIFT..

## 2021-08-21 NOTE — NUR
TELE RN OPENING NOTES



RECEIVED PATIENT AWAKE IN BED. ALERT AND ORIENTED X3. NO SIGNS OR SYMPTOMS OF DISTRESS 
NOTED. BREATHING IS EVEN AND UNLABORED. NO COMPLAINTS OF PAIN AT THIS TIME. IV ACCESS LFA#22 
PATENT AND INTACT, FLUSHING WELL. PATIENT IS ON NC 2L WITH SPO2 SATURATING AT 94%. PATIENT 
ON EXTERNAL TELE MONITOR WITH READING SR HR 62. SAFETY MEASURES ARE IN PLACE WITH BED LOCKED 
AT LOW POSITION, SIDE RAILS UP X 2. WILL CONTINUE TO MONITOR THROUGHOUT SHIFT.

## 2021-08-21 NOTE — NUR
TELE RN CLOSING NOTE



PT IS IN BED WITH EYES CLOSED, AROUSABLE TO STIMULATION. A/O X3. PT IS ON 2 LPM O2 VIA NC 
SATURATING AT 98%. NO SOB OR S/S OF RESPIRATORY DISTRESS NOTED. PT ON EXTERNAL CARDIAC 
MONITOR READING SR AT 65 BPM. PT HAS NO C/O PAIN OR DISCOMFORT AT THIS TIME. IV ACCESS IS 
INTACT, PATENT, AND FLUSHING WELL. ALL NEEDS HAVE BEEN MET. SAFETY AND ISOLATION PRECAUTIONS 
MAINTAINED AT ALL TIMES. BED IN LOWEST LOCKED POSITION, HOB ELEVATED, SIDE RAILS UP X2. CALL 
LIGHT AND TABLE WITHIN REACH. WILL ENDORSE TO ONCOMING NURSE FOR VALARIE.

## 2021-08-22 VITALS — DIASTOLIC BLOOD PRESSURE: 61 MMHG | SYSTOLIC BLOOD PRESSURE: 111 MMHG

## 2021-08-22 VITALS — SYSTOLIC BLOOD PRESSURE: 114 MMHG | DIASTOLIC BLOOD PRESSURE: 62 MMHG

## 2021-08-22 RX ADMIN — VITAMIN D, TAB 1000IU (100/BT) SCH UNIT: 25 TAB at 08:23

## 2021-08-22 RX ADMIN — Medication SCH MG: at 08:23

## 2021-08-22 RX ADMIN — DEXAMETHASONE SODIUM PHOSPHATE SCH MG: 10 INJECTION INTRAMUSCULAR; INTRAVENOUS at 08:23

## 2021-08-22 RX ADMIN — METOPROLOL TARTRATE SCH MG: 25 TABLET, FILM COATED ORAL at 08:25

## 2021-08-22 RX ADMIN — PANTOPRAZOLE SODIUM SCH MG: 40 TABLET, DELAYED RELEASE ORAL at 08:23

## 2021-08-22 RX ADMIN — ASPIRIN 81 MG SCH MG: 81 TABLET ORAL at 08:23

## 2021-08-22 RX ADMIN — LOSARTAN POTASSIUM SCH MG: 50 TABLET, FILM COATED ORAL at 08:24

## 2021-08-22 RX ADMIN — Medication SCH ML: at 08:30

## 2021-08-22 RX ADMIN — DEXAMETHASONE SODIUM PHOSPHATE SCH MG: 10 INJECTION INTRAMUSCULAR; INTRAVENOUS at 09:00

## 2021-08-22 RX ADMIN — OXYCODONE HYDROCHLORIDE AND ACETAMINOPHEN SCH MG: 500 TABLET ORAL at 08:23

## 2021-08-22 NOTE — NUR
RN NOTES



PATIENT RECEIVED IN BED, SLEEPING EASILY AWAKEN, FARSI SPEAKING. ON NASAL CANNULA 2 LITERS, 
NO SIGNS OF RESPIRATORY DISTRESS, WITH EVEN NON-LABORED BREATHING. SKIN WARM AND DRY TO 
TOUCH. PATIENT DENIES ANY PAIN OR DISCOMFORT AT THIS TIME. SAFETY PRECAUTIONS IMPLEMENTED 
WITH BED LOCKED, BILATERAL SIDE RAILS UP, BED ALARM ON, AND CALL LIGHT WITHIN EASY REACH. 
WILL CONTINUE TO MONITOR PATIENT.

## 2021-08-22 NOTE — NUR
DISCHARGE RN NOTES



PATIENT ALERT AND ORIENTED X3. PATIENT VITAL SIGNS WITHIN NORMAL LIMITS, PATIENT ON NASAL 
CANNULA 2 LITER, BEING DISCHARGE TO HOME WITH OXYGEN. PATIENT ACCOUNTED FOR ALL BELONGINGS. 
SKIN KEPT CLEAN, WARM AND DRY, SKIN ASSESSMENT DONE AND INTACT. EXIT CARE PROVIDED. WITH 
MEDICATION SENT TO PREFERRED PHARMACY. MET ALL OF PATIENTS NEEDS. PATIENT LEFT UNIT VIA 
WHEELCHAIR, LEFT HOSPITAL VIA PRIVATE CAR WITH DAUGHTER.

## 2023-10-04 NOTE — NUR
RN NOTE

PATIENT IS IN BED WITH HOB AT SEMI GROSSMAN'S POSITION. PATIENT IS ON 4L NC WITH NO SIGNS OF 
LABORED BREATHING. LFOREARM 22 IS PATENT AND INTACT. BED IS LOCKED IN THE LOWEST POSITION, 3 
GUARD RAILS RAISED, CALL BELL WITHIN REACH, AND ALL HOSPITAL SAFETY PRECAUTIONS ARE BEING 
FOLLOWED. ALL DUE MEDS GIVEN AND PATIENT REMAINED STABLE THROUGHOUT SHIFT. WILL ENDORSE TO 
NIGHT SHIFT RN. Topical Steroids Applications Pregnancy And Lactation Text: Most topical steroids are considered safe to use during pregnancy and lactation.  Any topical steroid applied to the breast or nipple should be washed off before breastfeeding.

## 2025-01-22 NOTE — NUR
Medicare Wellness Visit  Plan for Preventive Care    A good way for you to stay healthy is to use preventive care.  Medicare covers many services that can help you stay healthy.* The goal of these services is to find any health problems as quickly as possible. Finding problems early can help make them easier to treat.  Your personal plan below lists the services you may need and when they are due.      Health Maintenance Summary     Hepatitis C Screening (Once)  Order placed this encounter    Medicare Advantage- Medicare Wellness Visit (Yearly - January to December)  Due since 1/1/2025    Depression Screening (Yearly)  Next due on 1/15/2026    Breast Cancer Screening (Every 2 Years)  Next due on 9/30/2026    Colorectal Cancer Risk - Colonoscopy (Every 5 Years)  Next due on 12/14/2027    DTaP/Tdap/Td Vaccine (3 - Td or Tdap)  Next due on 10/19/2033    Hepatitis B Vaccine (For Physician/APC Discussion)   Completed    Shingles Vaccine   Completed    Pneumococcal Vaccine 65+   Completed    Osteoporosis Screening   Completed    Respiratory Syncytial Virus (RSV) Vaccine 60+   Completed    Influenza Vaccine   Completed    COVID-19 Vaccine   Completed    Hepatitis A Vaccine   Aged Out    Meningococcal Vaccine   Aged Out    HPV Vaccine   Aged Out           Preventive Care for Women and Men    Heart Screenings (Cardiovascular):  Blood tests are used to check your cholesterol, lipid and triglyceride levels. High levels can increase your risk for heart disease and stroke. High levels can be treated with medications, diet and exercise. Lowering your levels can help keep your heart and blood vessels healthy.  Your provider will order these tests if they are needed.    An ultrasound is done to see if you have an abdominal aortic aneurysm (AAA).  This is an enlargement of one of the main blood vessels that delivers blood to the body.   In the [place], 9,000 deaths are caused by AAA.  You may not even know you have this  RN NOTE



BP RECHECKED, 178/66. HYDRALAZINE GIVEN, WILL RECHECK IN AN HOUR. problem and as many as 1 in 3 people will have a serious problem if it is not treated.  Early diagnosis allows for more effective treatment and cure.  If you have a family history of AAA or are a male age 65-75 who has smoked, you are at higher risk of an AAA.  Your provider can order this test, if needed.    Colorectal Screening:  There are many tests that are used to check for cancer of your colon and rectum. You and your provider should discuss what test is best for you and when to have it done.  Options include:  Screening Colonoscopy: exam of the entire colon, seen through a flexible lighted tube.  Flexible Sigmoidoscopy: exam of the last third (sigmoid portion) of the colon and rectum, seen through a flexible lighted tube.  Cologuard DNA stool test: a sample of your stool is used to screen for cancer and unseen blood in your stool.  Fecal Occult Blood Test: a sample of your stool is studied to find any unseen blood    Flu Shot:  An immunization that helps to prevent influenza (the flu). You should get this every year. The best time to get the shot is in the fall.    Pneumococcal Shot:  Vaccines help prevent pneumococcal disease, which is any type of illness caused by Streptococcus pneumoniae bacteria. There are two kinds of pneumococcal vaccines available in the [place]:   Pneumococcal conjugate vaccines (PCV20 or Edgfvof51®)  Pneumococcal polysaccharide vaccine (PPSV23 or [id number]®)  For those who have never received any pneumococcal conjugate vaccine, CDC recommends PVC20 for adults 65 years or older and adults 19 through 64 years old with certain medical conditions or risk factors.   For those who have previously received PCV13, this should be followed by a dose of PPSV23.     Hepatitis B Shot:  An immunization that helps to protect people from getting Hepatitis B. Hepatitis B is a virus that spreads through contact with infected blood or body fluids. Many people with the virus do not have  symptoms.  The virus can lead to serious problems, such as liver disease. Some people are at higher risk than others. Your doctor will tell you if you need this shot.     Diabetes Screening:  A test to measure sugar (glucose) in your blood is called a fasting blood sugar. Fasting means you cannot have food or drink for at least 8 hours before the test. This test can detect diabetes long before you may notice symptoms.    Glaucoma Screening:  Glaucoma screening is performed by your eye doctor. The test measures the fluid pressure inside your eyes to determine if you have glaucoma.     Hepatitis C Screening:  A blood test to see if you have the hepatitis C virus.  Hepatitis C attacks the liver and is a major cause of chronic liver disease.  Medicare will cover a single screening for all adults born between 1945 & 1965, or high risk patients (people who have injected illegal drugs or people who have had blood transfusions).  High risk patients who continue to inject illegal drugs can be screened for Hepatitis C every year.    Smoking and Tobacco-Use Cessation Counseling:  Tobacco is the single greatest cause of disease and early death in our country today. Medication and counseling together can increase a person’s chance of quitting for good.   Medicare covers two quitting attempts per year, with four counseling sessions per attempt (eight sessions in a 12 month period)    Preventive Screening tests for Women    Screening Mammograms and Breast Exams:  An x-ray of your breasts to check for breast cancer before you or your doctor may be able to feel it.  If breast cancer is found early it can usually be treated with success.    Pelvic Exams and Pap Tests:  An exam to check for cervical and vaginal cancer. A Pap test is a lab test in which cells are taken from your cervix and sent to the lab to look for signs of cervical cancer. If cancer of the cervix is found early, chances for a cure are good. Testing can generally end  at age 65, or if a woman has a hysterectomy for a benign condition. Your provider may recommend more frequent testing if certain abnormal results are found.    Bone Mass Measurements:  A painless x-ray of your bone density to see if you are at risk for a broken bone. Bone density refers to the thickness of bones or how tightly the bone tissue is packed.    Preventive Screening tests for Men    Prostate Screening:  Should you have a prostate cancer test (PSA)?  It is up to you to decide if you want a prostate cancer test. Talk to your clinician to find out if the test is right for you.  Things for you to consider and talk about should include:  Benefits and harms of the test  Your family history  How your race/ethnicity may influence the test  If the test may impact other medical conditions you have  Your values on screenings and treatments    *Medicare pays for many preventive services to keep you healthy. For some of these services, you might have to pay a deductible, coinsurance, and / or copayment.  The amounts vary depending on the type of services you need and the kind of Medicare health plan you have.    For further details on screenings offered by Medicare please visit: https://www.medicare.gov/coverage/preventive-screening-services

## 2025-02-19 NOTE — NUR
RN NOTES



2 EMT'S CURRENTLY AT BEDSIDE TO PICKUP PATIENT. BEDSIDE REPORT AND ENDORSEMENT DONE. show